# Patient Record
Sex: FEMALE | URBAN - METROPOLITAN AREA
[De-identification: names, ages, dates, MRNs, and addresses within clinical notes are randomized per-mention and may not be internally consistent; named-entity substitution may affect disease eponyms.]

---

## 2021-02-24 ENCOUNTER — IMPORTED ENCOUNTER (OUTPATIENT)
Dept: URBAN - METROPOLITAN AREA CLINIC 9 | Facility: CLINIC | Age: 66
End: 2021-02-24

## 2021-02-24 PROBLEM — H11.153: Noted: 2021-02-24

## 2021-02-24 PROBLEM — H40.013: Noted: 2021-02-24

## 2021-02-24 PROBLEM — H04.123: Noted: 2021-02-24

## 2021-02-24 PROBLEM — H43.393: Noted: 2021-02-24

## 2021-02-24 PROBLEM — H25.13: Noted: 2021-02-24

## 2021-05-14 NOTE — PATIENT DISCUSSION
try ZPAK PO--if ni then KK for kenalog injection. Pt has had this for 1 month and large capsule today.

## 2021-06-01 NOTE — PATIENT DISCUSSION
Incision made on posterior surface as well to confirm no oil blocked. Post op instructions provided.

## 2021-06-01 NOTE — PROCEDURE NOTE: CLINICAL
PROCEDURE NOTE: Biopsy of Eyelid Left Upper Lid. Diagnosis: Eyelid Lesion, Uncertain Behavior. Anesthesia: Local. Risks, benefits and alternatives discussed. Patient desires to proceed with biopsy today. A drop of proparacaine was instilled in the involved eye. Involved area was prepped using alcohol wipe and anesthetized using 1% lidocaine with epi. Lesion was excised using mini Varun scissors and forceps and placed in formalin to be sent for histopathology. Wound was cauterized to achieve hemostasis and erythromycin ophthalmic ointment was applied. The patient tolerated the procedure well and left in good condition. Post op instructions were given to the patient. The patient understands to call us for any concerns. Perico Hilliard

## 2021-10-16 ASSESSMENT — VISUAL ACUITY
OS_CC: 14.2
OS_CC: 20/30 + SN
OD_SC: 20/80 + SN
OD_CC: 20/20 -2 SN
OS_CC: 20/20 - SN
OD_CC: 14.2
OS_SC: 20/70 - SN
OD_CC: 20/25 - SN

## 2021-10-16 ASSESSMENT — KERATOMETRY
OD_AXISANGLE_DEGREES: 178
OD_AXISANGLE2_DEGREES: 88
OS_AXISANGLE2_DEGREES: 98
OS_AXISANGLE_DEGREES: 8
OD_K1POWER_DIOPTERS: 43.5
OS_K2POWER_DIOPTERS: 44.25
OS_K1POWER_DIOPTERS: 43.5
OD_K2POWER_DIOPTERS: 43.75

## 2021-10-16 ASSESSMENT — TONOMETRY
OD_IOP_MMHG: 17
OS_IOP_MMHG: 17

## 2021-11-04 NOTE — PATIENT DISCUSSION
Dense PPA with difficulty with ONH RNFL. Follow for now without drops as low risk at this point in time.

## 2022-06-29 RX ORDER — ASPIRIN 81 MG/1
TABLET ORAL
COMMUNITY

## 2022-06-29 RX ORDER — PRAVASTATIN SODIUM 40 MG
TABLET ORAL
COMMUNITY

## 2022-06-29 RX ORDER — ESTRADIOL 0.1 MG/G
CREAM VAGINAL
COMMUNITY

## 2022-06-29 RX ORDER — CARVEDILOL 12.5 MG/1
TABLET ORAL
COMMUNITY

## 2022-06-29 RX ORDER — AMITRIPTYLINE HYDROCHLORIDE 75 MG/1
TABLET, FILM COATED ORAL
COMMUNITY

## 2022-06-29 RX ORDER — ESCITALOPRAM OXALATE 10 MG/1
TABLET ORAL
COMMUNITY

## 2022-06-29 RX ORDER — SPIRONOLACTONE 25 MG/1
TABLET ORAL
COMMUNITY

## 2022-07-21 ASSESSMENT — KERATOMETRY
OD_AXISANGLE2_DEGREES: 88
OS_AXISANGLE2_DEGREES: 98
OD_K2POWER_DIOPTERS: 43.75
OD_K1POWER_DIOPTERS: 43.5
OD_AXISANGLE_DEGREES: 178
OS_AXISANGLE_DEGREES: 8
OS_K1POWER_DIOPTERS: 43.5
OS_K2POWER_DIOPTERS: 44.25

## 2022-07-22 ENCOUNTER — ESTABLISHED PATIENT (OUTPATIENT)
Dept: URBAN - METROPOLITAN AREA CLINIC 9 | Facility: CLINIC | Age: 67
End: 2022-07-22

## 2022-07-22 DIAGNOSIS — H04.123: ICD-10-CM

## 2022-07-22 DIAGNOSIS — H25.13: ICD-10-CM

## 2022-07-22 DIAGNOSIS — H40.013: ICD-10-CM

## 2022-07-22 DIAGNOSIS — H52.03: ICD-10-CM

## 2022-07-22 PROCEDURE — 92015 DETERMINE REFRACTIVE STATE: CPT

## 2022-07-22 PROCEDURE — 92133 CPTRZD OPH DX IMG PST SGM ON: CPT

## 2022-07-22 PROCEDURE — 92014 COMPRE OPH EXAM EST PT 1/>: CPT

## 2022-07-22 PROCEDURE — 92310C CONTACT LENS 75

## 2022-07-22 ASSESSMENT — VISUAL ACUITY
OU_CC: 20/20-1
OS_CC: 20/20-1
OD_CC: 20/25+1
OU_SC: 20/70
OD_SC: 20/80
OS_SC: 20/70

## 2022-07-22 ASSESSMENT — TONOMETRY
OD_IOP_MMHG: 16
OS_IOP_MMHG: 17

## 2023-07-25 ENCOUNTER — ESTABLISHED PATIENT (OUTPATIENT)
Dept: URBAN - METROPOLITAN AREA CLINIC 9 | Facility: CLINIC | Age: 68
End: 2023-07-25

## 2023-07-25 DIAGNOSIS — H52.03: ICD-10-CM

## 2023-07-25 DIAGNOSIS — H40.013: ICD-10-CM

## 2023-07-25 DIAGNOSIS — H04.123: ICD-10-CM

## 2023-07-25 DIAGNOSIS — H25.13: ICD-10-CM

## 2023-07-25 PROCEDURE — 92014 COMPRE OPH EXAM EST PT 1/>: CPT

## 2023-07-25 PROCEDURE — 92015 DETERMINE REFRACTIVE STATE: CPT

## 2023-07-25 PROCEDURE — 92310C CONTACT LENS 75

## 2023-07-25 PROCEDURE — 92133 CPTRZD OPH DX IMG PST SGM ON: CPT

## 2023-07-25 ASSESSMENT — KERATOMETRY
OS_K1POWER_DIOPTERS: 43.5
OD_AXISANGLE_DEGREES: 178
OD_AXISANGLE2_DEGREES: 88
OD_K2POWER_DIOPTERS: 43.75
OS_AXISANGLE2_DEGREES: 98
OD_K1POWER_DIOPTERS: 43.5
OS_AXISANGLE_DEGREES: 8
OS_K2POWER_DIOPTERS: 44.25

## 2023-07-25 ASSESSMENT — TONOMETRY
OS_IOP_MMHG: 21
OD_IOP_MMHG: 19

## 2023-07-25 ASSESSMENT — VISUAL ACUITY
OU_CC: 20/20-1
OD_SC: 20/80
OU_SC: 20/60+1
OD_CC: 20/25-2
OS_CC: 20/25-2
OS_SC: 20/60-1

## 2024-07-26 ENCOUNTER — COMPREHENSIVE EXAM (OUTPATIENT)
Facility: LOCATION | Age: 69
End: 2024-07-26

## 2024-07-26 DIAGNOSIS — H40.013: ICD-10-CM

## 2024-07-26 DIAGNOSIS — H04.123: ICD-10-CM

## 2024-07-26 DIAGNOSIS — H52.03: ICD-10-CM

## 2024-07-26 DIAGNOSIS — H25.13: ICD-10-CM

## 2024-07-26 PROCEDURE — 92133 CPTRZD OPH DX IMG PST SGM ON: CPT

## 2024-07-26 PROCEDURE — 92014 COMPRE OPH EXAM EST PT 1/>: CPT

## 2024-07-26 PROCEDURE — 92015 DETERMINE REFRACTIVE STATE: CPT

## 2024-07-26 ASSESSMENT — VISUAL ACUITY
OS_CC: 20/25-1
OU_SC: 20/50-1
OD_CC: 20/30
OS_SC: 20/50-1
OU_CC: 20/25
OD_SC: 20/70

## 2024-07-26 ASSESSMENT — KERATOMETRY
OD_AXISANGLE2_DEGREES: 33
OS_K2POWER_DIOPTERS: 44.00
OD_K2POWER_DIOPTERS: 44.00
OS_K1POWER_DIOPTERS: 41.50
OD_AXISANGLE_DEGREES: 123
OD_K1POWER_DIOPTERS: 43.75
OS_AXISANGLE_DEGREES: 89
OS_AXISANGLE2_DEGREES: 179

## 2024-07-26 ASSESSMENT — TONOMETRY
OD_IOP_MMHG: 13
OS_IOP_MMHG: 17